# Patient Record
Sex: FEMALE | Race: WHITE | Employment: PART TIME | URBAN - METROPOLITAN AREA
[De-identification: names, ages, dates, MRNs, and addresses within clinical notes are randomized per-mention and may not be internally consistent; named-entity substitution may affect disease eponyms.]

---

## 2021-04-27 PROBLEM — E66.811 CLASS 1 OBESITY WITH BODY MASS INDEX (BMI) OF 34.0 TO 34.9 IN ADULT: Status: ACTIVE | Noted: 2021-04-27

## 2021-08-08 PROBLEM — E66.812 CLASS 2 OBESITY WITH BODY MASS INDEX (BMI) OF 35.0 TO 35.9 IN ADULT: Status: ACTIVE | Noted: 2021-08-08

## 2022-02-17 PROBLEM — E66.811 CLASS 1 OBESITY WITH BODY MASS INDEX (BMI) OF 34.0 TO 34.9 IN ADULT: Status: RESOLVED | Noted: 2021-04-27 | Resolved: 2022-02-17

## 2023-11-28 PROBLEM — E66.811 CLASS 1 OBESITY IN ADULT: Status: ACTIVE | Noted: 2023-11-28

## 2023-12-04 PROBLEM — E66.811 CLASS 1 OBESITY IN ADULT: Status: RESOLVED | Noted: 2023-11-28 | Resolved: 2023-12-04

## 2024-02-29 PROBLEM — E66.811 CLASS 1 OBESITY WITH BODY MASS INDEX (BMI) OF 34.0 TO 34.9 IN ADULT: Status: ACTIVE | Noted: 2024-02-29

## 2024-05-23 ENCOUNTER — OFFICE VISIT (OUTPATIENT)
Dept: FAMILY MEDICINE CLINIC | Age: 29
End: 2024-05-23

## 2024-05-23 ENCOUNTER — OFFICE VISIT (OUTPATIENT)
Dept: ENDOCRINOLOGY | Age: 29
End: 2024-05-23

## 2024-05-23 VITALS
RESPIRATION RATE: 18 BRPM | DIASTOLIC BLOOD PRESSURE: 80 MMHG | WEIGHT: 259.8 LBS | BODY MASS INDEX: 35.23 KG/M2 | OXYGEN SATURATION: 99 % | TEMPERATURE: 98.6 F | SYSTOLIC BLOOD PRESSURE: 108 MMHG | HEART RATE: 96 BPM

## 2024-05-23 VITALS
HEIGHT: 72 IN | DIASTOLIC BLOOD PRESSURE: 81 MMHG | TEMPERATURE: 98 F | SYSTOLIC BLOOD PRESSURE: 127 MMHG | BODY MASS INDEX: 34.95 KG/M2 | HEART RATE: 86 BPM | WEIGHT: 258 LBS | OXYGEN SATURATION: 99 % | RESPIRATION RATE: 14 BRPM

## 2024-05-23 DIAGNOSIS — R73.01 ELEVATED FASTING GLUCOSE: ICD-10-CM

## 2024-05-23 DIAGNOSIS — E55.9 VITAMIN D DEFICIENCY: ICD-10-CM

## 2024-05-23 DIAGNOSIS — N91.5 OLIGOMENORRHEA, UNSPECIFIED TYPE: ICD-10-CM

## 2024-05-23 DIAGNOSIS — E03.9 ACQUIRED HYPOTHYROIDISM: Primary | ICD-10-CM

## 2024-05-23 DIAGNOSIS — R79.89 ELEVATED LIVER FUNCTION TESTS: ICD-10-CM

## 2024-05-23 DIAGNOSIS — J06.9 PROTRACTED URI: Primary | ICD-10-CM

## 2024-05-23 DIAGNOSIS — E66.9 CLASS 1 OBESITY WITH BODY MASS INDEX (BMI) OF 34.0 TO 34.9 IN ADULT, UNSPECIFIED OBESITY TYPE, UNSPECIFIED WHETHER SERIOUS COMORBIDITY PRESENT: ICD-10-CM

## 2024-05-23 RX ORDER — AZITHROMYCIN 250 MG/1
TABLET, FILM COATED ORAL
Qty: 6 TABLET | Refills: 0 | Status: SHIPPED | OUTPATIENT
Start: 2024-05-23 | End: 2024-06-02

## 2024-05-23 ASSESSMENT — PATIENT HEALTH QUESTIONNAIRE - PHQ9
SUM OF ALL RESPONSES TO PHQ QUESTIONS 1-9: 0
3. TROUBLE FALLING OR STAYING ASLEEP: NOT AT ALL
SUM OF ALL RESPONSES TO PHQ QUESTIONS 1-9: 0
7. TROUBLE CONCENTRATING ON THINGS, SUCH AS READING THE NEWSPAPER OR WATCHING TELEVISION: NOT AT ALL
2. FEELING DOWN, DEPRESSED OR HOPELESS: NOT AT ALL
9. THOUGHTS THAT YOU WOULD BE BETTER OFF DEAD, OR OF HURTING YOURSELF: NOT AT ALL
4. FEELING TIRED OR HAVING LITTLE ENERGY: NOT AT ALL
8. MOVING OR SPEAKING SO SLOWLY THAT OTHER PEOPLE COULD HAVE NOTICED. OR THE OPPOSITE, BEING SO FIGETY OR RESTLESS THAT YOU HAVE BEEN MOVING AROUND A LOT MORE THAN USUAL: NOT AT ALL
SUM OF ALL RESPONSES TO PHQ9 QUESTIONS 1 & 2: 0
SUM OF ALL RESPONSES TO PHQ QUESTIONS 1-9: 0
10. IF YOU CHECKED OFF ANY PROBLEMS, HOW DIFFICULT HAVE THESE PROBLEMS MADE IT FOR YOU TO DO YOUR WORK, TAKE CARE OF THINGS AT HOME, OR GET ALONG WITH OTHER PEOPLE: NOT DIFFICULT AT ALL
SUM OF ALL RESPONSES TO PHQ QUESTIONS 1-9: 0
6. FEELING BAD ABOUT YOURSELF - OR THAT YOU ARE A FAILURE OR HAVE LET YOURSELF OR YOUR FAMILY DOWN: NOT AT ALL
5. POOR APPETITE OR OVEREATING: NOT AT ALL
1. LITTLE INTEREST OR PLEASURE IN DOING THINGS: NOT AT ALL

## 2024-05-23 ASSESSMENT — ENCOUNTER SYMPTOMS
SINUS COMPLAINT: 1
SORE THROAT: 1
SINUS PRESSURE: 1
RHINORRHEA: 1
DIARRHEA: 1
COUGH: 1

## 2024-05-23 NOTE — PROGRESS NOTES
SUBJECTIVE:  Torrie Acevedo is a 28 y.o. female who is being evaluated for elevated TSH.     1.  Acquired hypothyroidism  This started in 2021. Patient was diagnosed with elevated TSH. The problem has been unchanged. Previous thyroid studies include: TSH and free thyroxine.   Patient started medication in 2022. Currently patient is on: Levothyroxine. Misses Levothyroxine doses a month.  Was on thyroid medication in the past.     Current complaints: fatigue, not consistent     History of obstructive symptoms: difficulty swallowing No, changes in voice/hoarseness No.  History of radiation to patient's neck: No  Resent iodine exposure: No  Family history includes no thyroid abnormalities.  Family history of thyroid cancer: No     2. Vitamin D deficiency  No fatigue    3.  Elevated liver function tests  No nausea, vomiting, abdominal pain    4. Elevated fasting glucose  Has history of gestational diabetes    5.  Obesity  Diet and exercise    6. Oligomenorrhea, unspecified type  Trying to get pregnant  Periods every 2 months in the past  After delivery regular, now every 50-60  Has 1 child 16 yo boy  Started perods age 15  On BCP in the past  Has some hair growth on the face      Thyroid ultrasound       HISTORY: Thyroid enlargement       Right thyroid lobe 1.5 x 1.2 x 4.5 cm       Left lobe 1.2 x 1.6 x 3.6        Thyroid isthmus 3 mm       Parenchymal echotexture of the entire gland is uniform. No discrete nodule or extrathyroidal mass. Visualized carotid and jugular vessels patent.           Impression       Normal thyroid ultrasound         EXAMINATION:   CT OF THE PELVIS WITHOUT CONTRAST 2/5/2020 4:55 pm       TECHNIQUE:   CT of the pelvis was performed without the administration of intravenous   contrast.  Multiplanar reformatted images are provided for review. Dose   modulation, iterative reconstruction, and/or weight based adjustment of the   mA/kV was utilized to reduce the radiation dose to as low as

## 2024-05-23 NOTE — PROGRESS NOTES
(Exact Date)   SpO2 99%   BMI 35.23 kg/m²       Assessment/Plan    1. Protracted URI  - azithromycin (ZITHROMAX) 250 MG tablet; 500mg on day 1 followed by 250mg on days 2 - 5  Dispense: 6 tablet; Refill: 0  Symptomatic treatment: Tylenol / Advil, rest, salt water gargles, increase fluids.  May also use:Delsym.    Return if symptoms worsen or fail to improve.  See pt instructions  Discussed use, benefit, and side effects of prescribed medications. All patient questions answered.  Pt voiced understanding.

## 2024-05-24 DIAGNOSIS — Z31.69 ENCOUNTER FOR PRECONCEPTION CONSULTATION: ICD-10-CM

## 2024-05-24 LAB — PROGEST SERPL-MCNC: 21.47 NG/ML

## 2024-06-03 DIAGNOSIS — Z31.69 ENCOUNTER FOR PRECONCEPTION CONSULTATION: ICD-10-CM

## 2024-06-04 DIAGNOSIS — Z31.69 ENCOUNTER FOR PRECONCEPTION CONSULTATION: ICD-10-CM

## 2024-06-04 RX ORDER — VITAMIN A ACETATE, BETA CAROTENE, ASCORBIC ACID, CHOLECALCIFEROL, .ALPHA.-TOCOPHEROL ACETATE, DL-, THIAMINE MONONITRATE, RIBOFLAVIN, NIACINAMIDE, PYRIDOXINE HYDROCHLORIDE, FOLIC ACID, CYANOCOBALAMIN, CALCIUM CARBONATE, FERROUS FUMARATE, ZINC OXIDE, CUPRIC OXIDE 3080; 12; 120; 400; 1; 1.84; 3; 20; 22; 920; 25; 200; 27; 10; 2 [IU]/1; UG/1; MG/1; [IU]/1; MG/1; MG/1; MG/1; MG/1; MG/1; [IU]/1; MG/1; MG/1; MG/1; MG/1; MG/1
1 TABLET, FILM COATED ORAL DAILY
Qty: 90 TABLET | Refills: 3 | Status: SHIPPED | OUTPATIENT
Start: 2024-06-04

## 2024-06-04 RX ORDER — VITAMIN A ACETATE, BETA CAROTENE, ASCORBIC ACID, CHOLECALCIFEROL, .ALPHA.-TOCOPHEROL ACETATE, DL-, THIAMINE MONONITRATE, RIBOFLAVIN, NIACINAMIDE, PYRIDOXINE HYDROCHLORIDE, FOLIC ACID, CYANOCOBALAMIN, CALCIUM CARBONATE, FERROUS FUMARATE, ZINC OXIDE, CUPRIC OXIDE 3080; 12; 120; 400; 1; 1.84; 3; 20; 22; 920; 25; 200; 27; 10; 2 [IU]/1; UG/1; MG/1; [IU]/1; MG/1; MG/1; MG/1; MG/1; MG/1; [IU]/1; MG/1; MG/1; MG/1; MG/1; MG/1
1 TABLET, FILM COATED ORAL DAILY
Qty: 90 TABLET | Refills: 3 | Status: SHIPPED | OUTPATIENT
Start: 2024-06-04 | End: 2024-06-04 | Stop reason: SDUPTHER

## 2024-06-07 DIAGNOSIS — E55.9 VITAMIN D DEFICIENCY: ICD-10-CM

## 2024-06-07 DIAGNOSIS — E03.9 ACQUIRED HYPOTHYROIDISM: ICD-10-CM

## 2024-06-09 DIAGNOSIS — R10.13 DYSPEPSIA: ICD-10-CM

## 2024-06-10 RX ORDER — LEVOTHYROXINE SODIUM 0.05 MG/1
TABLET ORAL
Qty: 150 TABLET | Refills: 1 | Status: SHIPPED | OUTPATIENT
Start: 2024-06-10

## 2024-06-11 RX ORDER — OMEPRAZOLE 40 MG/1
40 CAPSULE, DELAYED RELEASE ORAL
Qty: 90 CAPSULE | Refills: 3 | Status: SHIPPED | OUTPATIENT
Start: 2024-06-11

## 2024-06-19 RX ORDER — BUPROPION HYDROCHLORIDE 300 MG/1
300 TABLET ORAL EVERY MORNING
Qty: 90 TABLET | Refills: 3 | Status: SHIPPED | OUTPATIENT
Start: 2024-06-19 | End: 2025-06-19

## 2024-06-19 NOTE — TELEPHONE ENCOUNTER
Medication:   Requested Prescriptions     Pending Prescriptions Disp Refills    buPROPion (WELLBUTRIN XL) 300 MG extended release tablet 90 tablet 3     Sig: Take 1 tablet by mouth every morning        Last Filled:      Patient Phone Number: [phone] (home)     Last appt: 5/23/2024   Next appt: Visit date not found    Last OARRS:        No data to display

## 2024-06-26 RX ORDER — ACETAMINOPHEN 325 MG/1
650 TABLET ORAL EVERY 6 HOURS PRN
Qty: 120 TABLET | Refills: 3 | Status: SHIPPED | OUTPATIENT
Start: 2024-06-26

## 2024-06-26 NOTE — TELEPHONE ENCOUNTER
Medication:   Requested Prescriptions     Pending Prescriptions Disp Refills    acetaminophen (AMINOFEN) 325 MG tablet 120 tablet 0     Sig: Take 2 tablets by mouth every 6 hours as needed for Pain        Last Filled:      Patient Phone Number: [phone] (home)     Last appt: 5/23/2024   Next appt: Visit date not found    Last OARRS:        No data to display

## 2024-08-23 DIAGNOSIS — E03.9 ACQUIRED HYPOTHYROIDISM: ICD-10-CM

## 2024-08-23 DIAGNOSIS — R79.89 ELEVATED LIVER FUNCTION TESTS: ICD-10-CM

## 2024-08-23 DIAGNOSIS — E55.9 VITAMIN D DEFICIENCY: ICD-10-CM

## 2024-08-24 LAB
25(OH)D3 SERPL-MCNC: 25.9 NG/ML
ALBUMIN SERPL-MCNC: 4.5 G/DL (ref 3.4–5)
ALBUMIN/GLOB SERPL: 1.6 {RATIO} (ref 1.1–2.2)
ALP SERPL-CCNC: 73 U/L (ref 40–129)
ALT SERPL-CCNC: 33 U/L (ref 10–40)
ANION GAP SERPL CALCULATED.3IONS-SCNC: 13 MMOL/L (ref 3–16)
AST SERPL-CCNC: 25 U/L (ref 15–37)
BILIRUB SERPL-MCNC: 0.4 MG/DL (ref 0–1)
BUN SERPL-MCNC: 16 MG/DL (ref 7–20)
CALCIUM SERPL-MCNC: 9.3 MG/DL (ref 8.3–10.6)
CHLORIDE SERPL-SCNC: 103 MMOL/L (ref 99–110)
CO2 SERPL-SCNC: 25 MMOL/L (ref 21–32)
CREAT SERPL-MCNC: 0.9 MG/DL (ref 0.6–1.1)
GFR SERPLBLD CREATININE-BSD FMLA CKD-EPI: 88 ML/MIN/{1.73_M2}
GLUCOSE SERPL-MCNC: 92 MG/DL (ref 70–99)
POTASSIUM SERPL-SCNC: 4.3 MMOL/L (ref 3.5–5.1)
PROT SERPL-MCNC: 7.3 G/DL (ref 6.4–8.2)
SODIUM SERPL-SCNC: 141 MMOL/L (ref 136–145)
T3FREE SERPL-MCNC: 2.7 PG/ML (ref 2.3–4.2)
T4 FREE SERPL-MCNC: 1.5 NG/DL (ref 0.9–1.8)
TSH SERPL DL<=0.005 MIU/L-ACNC: 1.6 UIU/ML (ref 0.27–4.2)

## 2024-08-29 ENCOUNTER — OFFICE VISIT (OUTPATIENT)
Dept: ENDOCRINOLOGY | Age: 29
End: 2024-08-29

## 2024-08-29 VITALS
BODY MASS INDEX: 35.35 KG/M2 | HEART RATE: 96 BPM | OXYGEN SATURATION: 99 % | HEIGHT: 72 IN | DIASTOLIC BLOOD PRESSURE: 91 MMHG | WEIGHT: 261 LBS | TEMPERATURE: 98 F | RESPIRATION RATE: 14 BRPM | SYSTOLIC BLOOD PRESSURE: 150 MMHG

## 2024-08-29 DIAGNOSIS — E55.9 VITAMIN D DEFICIENCY: ICD-10-CM

## 2024-08-29 DIAGNOSIS — R73.01 ELEVATED FASTING GLUCOSE: ICD-10-CM

## 2024-08-29 DIAGNOSIS — E66.01 CLASS 2 SEVERE OBESITY WITH SERIOUS COMORBIDITY AND BODY MASS INDEX (BMI) OF 35.0 TO 35.9 IN ADULT, UNSPECIFIED OBESITY TYPE (HCC): ICD-10-CM

## 2024-08-29 DIAGNOSIS — E03.9 ACQUIRED HYPOTHYROIDISM: Primary | ICD-10-CM

## 2024-08-29 DIAGNOSIS — R79.89 ELEVATED LIVER FUNCTION TESTS: ICD-10-CM

## 2024-08-29 DIAGNOSIS — N91.5 OLIGOMENORRHEA, UNSPECIFIED TYPE: ICD-10-CM

## 2024-08-29 NOTE — PROGRESS NOTES
SUBJECTIVE:  Torrie Acevedo is a 28 y.o. female who is being evaluated for elevated TSH.     1.  Acquired hypothyroidism  This started in 2021. Patient was diagnosed with elevated TSH. The problem has been unchanged. Previous thyroid studies include: TSH and free thyroxine.   Patient started medication in 2022. Currently patient is on: Levothyroxine. Misses Levothyroxine doses a month.  Was on thyroid medication in the past.     Current complaints: fatigue, not consistent     History of obstructive symptoms: difficulty swallowing No, changes in voice/hoarseness No.  History of radiation to patient's neck: No  Resent iodine exposure: No  Family history includes no thyroid abnormalities.  Family history of thyroid cancer: No     2. Vitamin D deficiency  No fatigue    3.  Elevated liver function tests  No nausea, vomiting, abdominal pain    4. Elevated fasting glucose  Has history of gestational diabetes    5.  Obesity  Diet and exercise    6. Oligomenorrhea, unspecified type  Trying to get pregnant  Periods every 2 months in the past  After delivery regular, now every 50-60  Has 1 child 18 yo boy  Started perods age 15  On BCP in the past  Has some hair growth on the face      Thyroid ultrasound       HISTORY: Thyroid enlargement       Right thyroid lobe 1.5 x 1.2 x 4.5 cm       Left lobe 1.2 x 1.6 x 3.6        Thyroid isthmus 3 mm       Parenchymal echotexture of the entire gland is uniform. No discrete nodule or extrathyroidal mass. Visualized carotid and jugular vessels patent.           Impression       Normal thyroid ultrasound         EXAMINATION:   CT OF THE PELVIS WITHOUT CONTRAST 2/5/2020 4:55 pm       TECHNIQUE:   CT of the pelvis was performed without the administration of intravenous   contrast.  Multiplanar reformatted images are provided for review. Dose   modulation, iterative reconstruction, and/or weight based adjustment of the   mA/kV was utilized to reduce the radiation dose to as low as  1.6 08/23/2024 09:46 AM    GLOB 3.1 09/10/2021 08:47 AM     Lab Results   Component Value Date/Time    TSHFT4 2.47 01/19/2021 09:53 AM    TSH 1.60 08/23/2024 09:46 AM    TSH 1.69 10/03/2018 11:54 AM    FT3 2.7 08/23/2024 09:46 AM     Lab Results   Component Value Date/Time    LABA1C 5.0 08/17/2023 02:08 PM     Lab Results   Component Value Date/Time    EAG 96.8 08/17/2023 02:08 PM     Lab Results   Component Value Date/Time    CHOL 147 10/03/2018 11:54 AM     Lab Results   Component Value Date/Time    TRIG 121 10/03/2018 11:54 AM     Lab Results   Component Value Date/Time    HDL 43 08/20/2020 10:44 AM     No components found for: \"LDLCHOLESTEROL\", \"LDLCALC\"    Lab Results   Component Value Date/Time    VLDL 24 08/20/2020 10:44 AM       No results found for: \"CHOLHDLRATIO\"  No results found for: \"CVHK90AVO\"    Lab Results   Component Value Date/Time    VITD25 25.9 08/23/2024 09:46 AM        ASSESSMENT/PLAN:  1. Acquired hypothyroidism  Prolactin 3.7  Had GB surgery  Needs lower TSH per OB-GYN   Levothyroxine 0.05 mg 1 tablet alternating with 1.5 tablet every other day  TSH 3.37-2.66-3.86-2.17-1.48-1.09-1.3-1.88-1.24-1.58-1.75-1.33-2.84-1.2-2.71-1.25-1.12- 1.6  Will try to get pregnant  TPO ab, Tg ab negative  - T3, Free; Future  - T4, Free; Future  - TSH without Reflex; Future     2. Vitamin D deficiency  Worsening  25OH vitamin D 28-34.6-30.8-26.4-29.6- 35.3-48.9-51.2-28.3 -30.4-27.4-30-28.7-30.4-25.9  On prenatal MVI  Start Vitamin D 1000 IU daily  - Comprehensive Metabolic Panel; Future  - Vitamin D 25 Hydroxy; Future    3.  Elevated liver function tests  Just had GB removed in 2/2023  ALT 67  AST 38  -CMP    4. Elevated fasting glucose  Has history of gestational diabetes  Glucose [phone]  Diet, exercise.  Metformin causes diarrhea, but taking  mg bid    5.  Obesity  Diet and exercise    6. Oligomenorrhea, unspecified type  10/10/2022 healthy baby boy.  Trying to get pregnant    Prolactin

## 2024-09-25 DIAGNOSIS — Z31.69 ENCOUNTER FOR PRECONCEPTION CONSULTATION: ICD-10-CM

## 2024-09-26 RX ORDER — METFORMIN HCL 500 MG
1000 TABLET, EXTENDED RELEASE 24 HR ORAL
Qty: 180 TABLET | Refills: 3 | Status: SHIPPED | OUTPATIENT
Start: 2024-09-26

## 2024-09-26 RX ORDER — BUPROPION HYDROCHLORIDE 300 MG/1
300 TABLET ORAL EVERY MORNING
Qty: 90 TABLET | Refills: 1 | Status: SHIPPED | OUTPATIENT
Start: 2024-09-26 | End: 2025-03-25

## 2024-10-02 ENCOUNTER — OFFICE VISIT (OUTPATIENT)
Dept: FAMILY MEDICINE CLINIC | Age: 29
End: 2024-10-02

## 2024-10-02 VITALS
HEART RATE: 92 BPM | WEIGHT: 256.8 LBS | SYSTOLIC BLOOD PRESSURE: 118 MMHG | OXYGEN SATURATION: 98 % | DIASTOLIC BLOOD PRESSURE: 82 MMHG | BODY MASS INDEX: 34.82 KG/M2

## 2024-10-02 DIAGNOSIS — F41.9 ANXIETY DISORDER, UNSPECIFIED TYPE: Primary | ICD-10-CM

## 2024-10-02 DIAGNOSIS — R19.5 CHANGE IN STOOL: ICD-10-CM

## 2024-10-02 DIAGNOSIS — Z23 NEED FOR VACCINATION: ICD-10-CM

## 2024-10-02 ASSESSMENT — ENCOUNTER SYMPTOMS: SHORTNESS OF BREATH: 0

## 2024-10-02 ASSESSMENT — PATIENT HEALTH QUESTIONNAIRE - PHQ9
10. IF YOU CHECKED OFF ANY PROBLEMS, HOW DIFFICULT HAVE THESE PROBLEMS MADE IT FOR YOU TO DO YOUR WORK, TAKE CARE OF THINGS AT HOME, OR GET ALONG WITH OTHER PEOPLE: NOT DIFFICULT AT ALL
SUM OF ALL RESPONSES TO PHQ QUESTIONS 1-9: 0
3. TROUBLE FALLING OR STAYING ASLEEP: NOT AT ALL
SUM OF ALL RESPONSES TO PHQ QUESTIONS 1-9: 0
9. THOUGHTS THAT YOU WOULD BE BETTER OFF DEAD, OR OF HURTING YOURSELF: NOT AT ALL
4. FEELING TIRED OR HAVING LITTLE ENERGY: NOT AT ALL
8. MOVING OR SPEAKING SO SLOWLY THAT OTHER PEOPLE COULD HAVE NOTICED. OR THE OPPOSITE, BEING SO FIGETY OR RESTLESS THAT YOU HAVE BEEN MOVING AROUND A LOT MORE THAN USUAL: NOT AT ALL
1. LITTLE INTEREST OR PLEASURE IN DOING THINGS: NOT AT ALL
SUM OF ALL RESPONSES TO PHQ QUESTIONS 1-9: 0
SUM OF ALL RESPONSES TO PHQ9 QUESTIONS 1 & 2: 0
6. FEELING BAD ABOUT YOURSELF - OR THAT YOU ARE A FAILURE OR HAVE LET YOURSELF OR YOUR FAMILY DOWN: NOT AT ALL
7. TROUBLE CONCENTRATING ON THINGS, SUCH AS READING THE NEWSPAPER OR WATCHING TELEVISION: NOT AT ALL
SUM OF ALL RESPONSES TO PHQ QUESTIONS 1-9: 0
5. POOR APPETITE OR OVEREATING: NOT AT ALL
2. FEELING DOWN, DEPRESSED OR HOPELESS: NOT AT ALL

## 2024-10-02 NOTE — PROGRESS NOTES
SUBJECTIVE:  Pt is a of 28 y.o. female comes in today with   Chief Complaint   Patient presents with    Depression     Pt wants to get off anti depressants     Stool Color Change     Pt states she passes corn for weeks after she eats them      Presenting today for re-evaluation of depression and anxiety. Does not feel she needs medication anymore. Denies feeling nervous, anxious or depressed. Does not feel the medication ever \"did anything\". She is seeing fertility specialist and wants to stop medication.     Concerned about stool. States when she eats corn she will pass kernels for weeks or months afterwards. No pain. No blood or mucus in stool. Chronic diarrhea since gb removed 3/23. No unexplained weight loss.     Prior to Visit Medications    Medication Sig Taking? Authorizing Provider   metFORMIN (GLUCOPHAGE-XR) 500 MG extended release tablet Take 2 tablets by mouth Daily with supper Yes Madeline Pérez MD   buPROPion (WELLBUTRIN XL) 300 MG extended release tablet Take 1 tablet by mouth every morning Yes Abida Doyle APRN - CNP   vitamin D (CHOLECALCIFEROL) 25 MCG (1000 UT) TABS tablet Take 1 tablet by mouth daily Yes Mary Bailey MD   acetaminophen (AMINOFEN) 325 MG tablet Take 2 tablets by mouth every 6 hours as needed for Pain Yes Abida Doyle APRN - CNP   omeprazole (PRILOSEC) 40 MG delayed release capsule Take 1 capsule by mouth every morning (before breakfast) Yes Abida Doyle APRN - CNP   levothyroxine (SYNTHROID) 50 MCG tablet 1 tablet alternating with 1.5 tablet every other day Yes Mary Bailey MD   Prenatal Vit-Fe Fumarate-FA (PRENATAL PLUS) 27-1 MG TABS Take 1 tablet by mouth daily Yes Madeline Pérez MD   ibuprofen (ADVIL;MOTRIN) 600 MG tablet TAKE 1 TABLET BY MOUTH EVERY 6 HOURS AS NEEDED FOR PAIN (GENERIC FOR MOTRIN) Yes Abida Doyle APRN - CNP   EPINEPHrine (EPIPEN) 0.3 MG/0.3ML SOAJ injection INJECT SYRINGE AS DIRECTED INTO THE MUSCLE FOR ALLERGIC REACTION Yes Michael Weiner

## 2024-10-28 ENCOUNTER — TELEMEDICINE (OUTPATIENT)
Dept: FAMILY MEDICINE CLINIC | Age: 29
End: 2024-10-28

## 2024-10-28 DIAGNOSIS — J06.9 ACUTE UPPER RESPIRATORY INFECTION, UNSPECIFIED: Primary | ICD-10-CM

## 2024-10-28 LAB
INFLUENZA A ANTIBODY: NORMAL
INFLUENZA B ANTIBODY: NORMAL
Lab: NORMAL
QC PASS/FAIL: NORMAL
SARS-COV-2 RDRP RESP QL NAA+PROBE: NEGATIVE

## 2024-10-28 RX ORDER — IBUPROFEN 600 MG/1
600 TABLET, FILM COATED ORAL EVERY 6 HOURS PRN
Qty: 90 TABLET | Refills: 0 | Status: SHIPPED | OUTPATIENT
Start: 2024-10-28

## 2024-10-28 ASSESSMENT — ENCOUNTER SYMPTOMS
SHORTNESS OF BREATH: 0
COUGH: 1
SINUS PRESSURE: 1
CHOKING: 0
SORE THROAT: 1
WHEEZING: 0
CHEST TIGHTNESS: 0
DIARRHEA: 1
VOMITING: 0

## 2024-10-28 ASSESSMENT — PATIENT HEALTH QUESTIONNAIRE - PHQ9
SUM OF ALL RESPONSES TO PHQ QUESTIONS 1-9: 0
5. POOR APPETITE OR OVEREATING: NOT AT ALL
SUM OF ALL RESPONSES TO PHQ9 QUESTIONS 1 & 2: 0
10. IF YOU CHECKED OFF ANY PROBLEMS, HOW DIFFICULT HAVE THESE PROBLEMS MADE IT FOR YOU TO DO YOUR WORK, TAKE CARE OF THINGS AT HOME, OR GET ALONG WITH OTHER PEOPLE: NOT DIFFICULT AT ALL
6. FEELING BAD ABOUT YOURSELF - OR THAT YOU ARE A FAILURE OR HAVE LET YOURSELF OR YOUR FAMILY DOWN: NOT AT ALL
8. MOVING OR SPEAKING SO SLOWLY THAT OTHER PEOPLE COULD HAVE NOTICED. OR THE OPPOSITE, BEING SO FIGETY OR RESTLESS THAT YOU HAVE BEEN MOVING AROUND A LOT MORE THAN USUAL: NOT AT ALL
4. FEELING TIRED OR HAVING LITTLE ENERGY: NOT AT ALL
1. LITTLE INTEREST OR PLEASURE IN DOING THINGS: NOT AT ALL
9. THOUGHTS THAT YOU WOULD BE BETTER OFF DEAD, OR OF HURTING YOURSELF: NOT AT ALL
3. TROUBLE FALLING OR STAYING ASLEEP: NOT AT ALL
SUM OF ALL RESPONSES TO PHQ QUESTIONS 1-9: 0
SUM OF ALL RESPONSES TO PHQ QUESTIONS 1-9: 0
7. TROUBLE CONCENTRATING ON THINGS, SUCH AS READING THE NEWSPAPER OR WATCHING TELEVISION: NOT AT ALL
2. FEELING DOWN, DEPRESSED OR HOPELESS: NOT AT ALL
SUM OF ALL RESPONSES TO PHQ QUESTIONS 1-9: 0

## 2024-10-28 NOTE — PROGRESS NOTES
Date of Visit:  10/28/2024    CC: Torrie Acevedo (: 1995) is a 28 y.o. female, established patient, here for evaluation/re-evaluation of the following medical concerns:    ASSESSMENT/PLAN:  Acute upper respiratory infection, unspecified  -     ibuprofen (ADVIL;MOTRIN) 600 MG tablet; Take 1 tablet by mouth every 6 hours as needed for Pain, Disp-90 tablet, R-0Normal  -     POCT COVID-19 Rapid, NAAT  -     POCT Influenza A/B  -     POCT rapid strep A  Pt will come to office today for testing  Symptomatic treatment: Tylenol / Advil, rest, salt water gargles, increase fluids.  May also use: current treatments in place      Return if symptoms worsen or fail to improve.         10/28/2024     9:56 AM   Patient-Reported Vitals   Patient-Reported Weight 258   Patient-Reported Height 6’0        Wt Readings from Last 3 Encounters:   10/02/24 116.5 kg (256 lb 12.8 oz)   24 118.4 kg (261 lb)   24 117.8 kg (259 lb 12.8 oz)     BP Readings from Last 3 Encounters:   10/02/24 118/82   24 (!) 150/91   24 108/80     Estimated body mass index is 34.82 kg/m² as calculated from the following:    Height as of 24: 1.829 m (6' 0.01\").    Weight as of 10/2/24: 116.5 kg (256 lb 12.8 oz).    HPI  Home COVID test negative yesterday    Cold Symptoms   This is a new problem. The current episode started in the past 7 days (5 days ago). The problem has been gradually worsening. Associated symptoms include congestion, coughing, diarrhea (chronic, no worse than normal), headaches and a sore throat. Pertinent negatives include no ear pain, vomiting or wheezing. Treatments tried: Mucinex, Dayquil, Nyquil. The treatment provided mild relief.         Review of Systems   Constitutional:  Positive for chills and fatigue (body aches). Negative for fever.   HENT:  Positive for congestion, postnasal drip, sinus pressure and sore throat. Negative for ear pain.    Respiratory:  Positive for cough. Negative for choking,

## 2024-12-02 ENCOUNTER — TELEPHONE (OUTPATIENT)
Dept: ENDOCRINOLOGY | Age: 29
End: 2024-12-02

## 2024-12-02 DIAGNOSIS — E03.9 ACQUIRED HYPOTHYROIDISM: ICD-10-CM

## 2024-12-02 DIAGNOSIS — R79.89 ELEVATED LIVER FUNCTION TESTS: ICD-10-CM

## 2024-12-02 DIAGNOSIS — N91.5 OLIGOMENORRHEA, UNSPECIFIED TYPE: ICD-10-CM

## 2024-12-02 DIAGNOSIS — E55.9 VITAMIN D DEFICIENCY: ICD-10-CM

## 2024-12-02 DIAGNOSIS — R73.01 ELEVATED FASTING GLUCOSE: ICD-10-CM

## 2024-12-02 RX ORDER — LEVOTHYROXINE SODIUM 50 UG/1
TABLET ORAL
Qty: 150 TABLET | Refills: 1 | Status: SHIPPED | OUTPATIENT
Start: 2024-12-02 | End: 2024-12-05 | Stop reason: SDUPTHER

## 2024-12-02 NOTE — TELEPHONE ENCOUNTER
Pt called in and is needing her lab work sent to .  Can you please fax this over today, if possible she needs to get it done prior to her visit.       Lab Fax: [phone]

## 2024-12-03 LAB
25(OH)D3 SERPL-MCNC: 26.3 NG/ML
ALBUMIN SERPL-MCNC: 4.4 G/DL (ref 3.4–5)
ALBUMIN/GLOB SERPL: 1.7 {RATIO} (ref 1.1–2.2)
ALP SERPL-CCNC: 65 U/L (ref 40–129)
ALT SERPL-CCNC: 16 U/L (ref 10–40)
ANION GAP SERPL CALCULATED.3IONS-SCNC: 11 MMOL/L (ref 3–16)
AST SERPL-CCNC: 19 U/L (ref 15–37)
BILIRUB SERPL-MCNC: <0.2 MG/DL (ref 0–1)
BUN SERPL-MCNC: 16 MG/DL (ref 7–20)
CALCIUM SERPL-MCNC: 9.5 MG/DL (ref 8.3–10.6)
CHLORIDE SERPL-SCNC: 102 MMOL/L (ref 99–110)
CO2 SERPL-SCNC: 27 MMOL/L (ref 21–32)
CREAT SERPL-MCNC: 0.8 MG/DL (ref 0.6–1.1)
EST. AVERAGE GLUCOSE BLD GHB EST-MCNC: 93.9 MG/DL
GFR SERPLBLD CREATININE-BSD FMLA CKD-EPI: >90 ML/MIN/{1.73_M2}
GLUCOSE SERPL-MCNC: 86 MG/DL (ref 70–99)
HBA1C MFR BLD: 4.9 %
POTASSIUM SERPL-SCNC: 4.3 MMOL/L (ref 3.5–5.1)
PROT SERPL-MCNC: 7 G/DL (ref 6.4–8.2)
SODIUM SERPL-SCNC: 140 MMOL/L (ref 136–145)
T3FREE SERPL-MCNC: 3.1 PG/ML (ref 2.3–4.2)
T4 FREE SERPL-MCNC: 1.1 NG/DL (ref 0.9–1.8)
TSH SERPL DL<=0.005 MIU/L-ACNC: 1.48 UIU/ML (ref 0.27–4.2)

## 2024-12-05 ENCOUNTER — OFFICE VISIT (OUTPATIENT)
Dept: ENDOCRINOLOGY | Age: 29
End: 2024-12-05
Payer: COMMERCIAL

## 2024-12-05 VITALS
HEART RATE: 103 BPM | WEIGHT: 261 LBS | DIASTOLIC BLOOD PRESSURE: 83 MMHG | SYSTOLIC BLOOD PRESSURE: 139 MMHG | BODY MASS INDEX: 35.35 KG/M2 | HEIGHT: 72 IN | OXYGEN SATURATION: 97 % | RESPIRATION RATE: 14 BRPM | TEMPERATURE: 98 F

## 2024-12-05 DIAGNOSIS — R73.01 ELEVATED FASTING GLUCOSE: ICD-10-CM

## 2024-12-05 DIAGNOSIS — E66.01 CLASS 2 SEVERE OBESITY WITH SERIOUS COMORBIDITY AND BODY MASS INDEX (BMI) OF 35.0 TO 35.9 IN ADULT, UNSPECIFIED OBESITY TYPE: ICD-10-CM

## 2024-12-05 DIAGNOSIS — N91.5 OLIGOMENORRHEA, UNSPECIFIED TYPE: ICD-10-CM

## 2024-12-05 DIAGNOSIS — E66.812 CLASS 2 SEVERE OBESITY WITH SERIOUS COMORBIDITY AND BODY MASS INDEX (BMI) OF 35.0 TO 35.9 IN ADULT, UNSPECIFIED OBESITY TYPE: ICD-10-CM

## 2024-12-05 DIAGNOSIS — E55.9 VITAMIN D DEFICIENCY: ICD-10-CM

## 2024-12-05 DIAGNOSIS — E03.9 ACQUIRED HYPOTHYROIDISM: Primary | ICD-10-CM

## 2024-12-05 DIAGNOSIS — R79.89 ELEVATED LIVER FUNCTION TESTS: ICD-10-CM

## 2024-12-05 PROCEDURE — 99214 OFFICE O/P EST MOD 30 MIN: CPT | Performed by: INTERNAL MEDICINE

## 2024-12-05 RX ORDER — LEVOTHYROXINE SODIUM 50 UG/1
TABLET ORAL
Qty: 150 TABLET | Refills: 1 | Status: SHIPPED | OUTPATIENT
Start: 2024-12-05

## 2024-12-05 RX ORDER — MULTIVIT-MIN/IRON/FOLIC ACID/K 18-600-40
2000 CAPSULE ORAL DAILY
Qty: 90 CAPSULE | Refills: 1 | Status: SHIPPED | OUTPATIENT
Start: 2024-12-05

## 2024-12-05 NOTE — PROGRESS NOTES
SUBJECTIVE:  Torrie Acevedo is a 29 y.o. female who is being evaluated for elevated TSH.     1.  Acquired hypothyroidism  This started in 2021. Patient was diagnosed with elevated TSH. The problem has been unchanged. Previous thyroid studies include: TSH and free thyroxine.   Patient started medication in 2022. Currently patient is on: Levothyroxine. Misses Levothyroxine doses a month.  Was on thyroid medication in the past.     Current complaints: fatigue, not consistent     History of obstructive symptoms: difficulty swallowing No, changes in voice/hoarseness No.  History of radiation to patient's neck: No  Resent iodine exposure: No  Family history includes no thyroid abnormalities.  Family history of thyroid cancer: No     2. Vitamin D deficiency  No fatigue    3.  Elevated liver function tests  No nausea, vomiting, abdominal pain    4. Elevated fasting glucose  Has history of gestational diabetes    5.  Obesity  Diet and exercise    6. Oligomenorrhea, unspecified [id number]  Periods every 2 months in the past  After delivery regular, now every 50-60  Has 1 child 18 yo boy  Started perods age 15  On BCP in the past  Has some hair growth on the face      Thyroid ultrasound       HISTORY: Thyroid enlargement       Right thyroid lobe 1.5 x 1.2 x 4.5 cm       Left lobe 1.2 x 1.6 x 3.6        Thyroid isthmus 3 mm       Parenchymal echotexture of the entire gland is uniform. No discrete nodule or extrathyroidal mass. Visualized carotid and jugular vessels patent.           Impression       Normal thyroid ultrasound         EXAMINATION:   CT OF THE PELVIS WITHOUT CONTRAST 2/5/2020 4:55 pm       TECHNIQUE:   CT of the pelvis was performed without the administration of intravenous   contrast.  Multiplanar reformatted images are provided for review. Dose   modulation, iterative reconstruction, and/or weight based adjustment of the   mA/kV was utilized to reduce the radiation dose to as low as reasonably   achievable.

## 2025-02-18 LAB
ALBUMIN: 4.3 G/DL (ref 3.5–5.7)
ALP BLD-CCNC: 56 U/L (ref 36–125)
ALT SERPL-CCNC: 14 U/L (ref 7–52)
ANION GAP SERPL CALCULATED.3IONS-SCNC: 11 MMOL/L (ref 3–16)
AST SERPL-CCNC: 15 U/L (ref 13–39)
BILIRUB SERPL-MCNC: 0.3 MG/DL (ref 0–1.5)
BUN BLDV-MCNC: 14 MG/DL (ref 7–25)
CALCIUM SERPL-MCNC: 9.7 MG/DL (ref 8.6–10.3)
CHLORIDE BLD-SCNC: 105 MMOL/L (ref 98–110)
CO2: 25 MMOL/L (ref 21–33)
CREAT SERPL-MCNC: 0.84 MG/DL (ref 0.6–1.3)
ESTIMATED GLOMERULAR FILTRATION RATE CREATININE EQUATION: >90
GLUCOSE BLD-MCNC: 96 MG/DL (ref 70–100)
HBA1C MFR BLD: 5.2 % (ref 4–5.6)
OSMOLALITY CALCULATION: 292 MOSM/KG (ref 278–305)
POTASSIUM SERPL-SCNC: 4 MMOL/L (ref 3.5–5.3)
SODIUM BLD-SCNC: 141 MMOL/L (ref 133–146)
T4 FREE: 0.79 NG/DL (ref 0.61–1.76)
TOTAL PROTEIN: 7.3 G/DL (ref 6.4–8.9)
TSH ULTRASENSITIVE: 1.61 UIU/ML (ref 0.45–4.12)
VITAMIN D 25-HYDROXY: 38.1 NG/ML (ref 30–100)

## 2025-03-17 ENCOUNTER — OFFICE VISIT (OUTPATIENT)
Dept: FAMILY MEDICINE CLINIC | Age: 30
End: 2025-03-17
Payer: COMMERCIAL

## 2025-03-17 ENCOUNTER — PATIENT MESSAGE (OUTPATIENT)
Dept: FAMILY MEDICINE CLINIC | Age: 30
End: 2025-03-17

## 2025-03-17 VITALS
WEIGHT: 261.2 LBS | SYSTOLIC BLOOD PRESSURE: 104 MMHG | OXYGEN SATURATION: 98 % | DIASTOLIC BLOOD PRESSURE: 78 MMHG | HEIGHT: 72 IN | HEART RATE: 91 BPM | BODY MASS INDEX: 35.38 KG/M2

## 2025-03-17 DIAGNOSIS — E66.01 CLASS 2 SEVERE OBESITY WITH SERIOUS COMORBIDITY AND BODY MASS INDEX (BMI) OF 35.0 TO 35.9 IN ADULT, UNSPECIFIED OBESITY TYPE: ICD-10-CM

## 2025-03-17 DIAGNOSIS — M25.562 CHRONIC PAIN OF LEFT KNEE: Primary | ICD-10-CM

## 2025-03-17 DIAGNOSIS — M79.604 BILATERAL LEG PAIN: ICD-10-CM

## 2025-03-17 DIAGNOSIS — G43.709 CHRONIC MIGRAINE WITHOUT AURA WITHOUT STATUS MIGRAINOSUS, NOT INTRACTABLE: ICD-10-CM

## 2025-03-17 DIAGNOSIS — E66.812 CLASS 2 SEVERE OBESITY WITH SERIOUS COMORBIDITY AND BODY MASS INDEX (BMI) OF 35.0 TO 35.9 IN ADULT, UNSPECIFIED OBESITY TYPE: ICD-10-CM

## 2025-03-17 DIAGNOSIS — M79.605 BILATERAL LEG PAIN: ICD-10-CM

## 2025-03-17 DIAGNOSIS — G89.29 CHRONIC PAIN OF LEFT KNEE: Primary | ICD-10-CM

## 2025-03-17 DIAGNOSIS — Z00.00 WELL ADULT EXAM: Primary | ICD-10-CM

## 2025-03-17 PROBLEM — E66.811 CLASS 1 OBESITY WITH BODY MASS INDEX (BMI) OF 34.0 TO 34.9 IN ADULT: Status: RESOLVED | Noted: 2024-02-29 | Resolved: 2025-03-17

## 2025-03-17 PROCEDURE — 99395 PREV VISIT EST AGE 18-39: CPT | Performed by: NURSE PRACTITIONER

## 2025-03-17 PROCEDURE — 99214 OFFICE O/P EST MOD 30 MIN: CPT | Performed by: NURSE PRACTITIONER

## 2025-03-17 RX ORDER — RIZATRIPTAN BENZOATE 10 MG/1
10 TABLET ORAL
Qty: 9 TABLET | Refills: 2 | Status: SHIPPED | OUTPATIENT
Start: 2025-03-17 | End: 2025-03-17

## 2025-03-17 RX ORDER — LETROZOLE 2.5 MG/1
7.5 TABLET, FILM COATED ORAL DAILY
COMMUNITY
Start: 2025-01-22

## 2025-03-17 SDOH — ECONOMIC STABILITY: FOOD INSECURITY: WITHIN THE PAST 12 MONTHS, THE FOOD YOU BOUGHT JUST DIDN'T LAST AND YOU DIDN'T HAVE MONEY TO GET MORE.: NEVER TRUE

## 2025-03-17 SDOH — ECONOMIC STABILITY: FOOD INSECURITY: WITHIN THE PAST 12 MONTHS, YOU WORRIED THAT YOUR FOOD WOULD RUN OUT BEFORE YOU GOT MONEY TO BUY MORE.: NEVER TRUE

## 2025-03-17 ASSESSMENT — PATIENT HEALTH QUESTIONNAIRE - PHQ9
1. LITTLE INTEREST OR PLEASURE IN DOING THINGS: NOT AT ALL
SUM OF ALL RESPONSES TO PHQ QUESTIONS 1-9: 0
2. FEELING DOWN, DEPRESSED OR HOPELESS: NOT AT ALL
4. FEELING TIRED OR HAVING LITTLE ENERGY: NOT AT ALL
5. POOR APPETITE OR OVEREATING: NOT AT ALL
SUM OF ALL RESPONSES TO PHQ QUESTIONS 1-9: 0
9. THOUGHTS THAT YOU WOULD BE BETTER OFF DEAD, OR OF HURTING YOURSELF: NOT AT ALL
6. FEELING BAD ABOUT YOURSELF - OR THAT YOU ARE A FAILURE OR HAVE LET YOURSELF OR YOUR FAMILY DOWN: NOT AT ALL
8. MOVING OR SPEAKING SO SLOWLY THAT OTHER PEOPLE COULD HAVE NOTICED. OR THE OPPOSITE, BEING SO FIGETY OR RESTLESS THAT YOU HAVE BEEN MOVING AROUND A LOT MORE THAN USUAL: NOT AT ALL
10. IF YOU CHECKED OFF ANY PROBLEMS, HOW DIFFICULT HAVE THESE PROBLEMS MADE IT FOR YOU TO DO YOUR WORK, TAKE CARE OF THINGS AT HOME, OR GET ALONG WITH OTHER PEOPLE: NOT DIFFICULT AT ALL
SUM OF ALL RESPONSES TO PHQ QUESTIONS 1-9: 0
SUM OF ALL RESPONSES TO PHQ QUESTIONS 1-9: 0
3. TROUBLE FALLING OR STAYING ASLEEP: NOT AT ALL
7. TROUBLE CONCENTRATING ON THINGS, SUCH AS READING THE NEWSPAPER OR WATCHING TELEVISION: NOT AT ALL

## 2025-03-17 NOTE — ASSESSMENT & PLAN NOTE
Chronic, not at goal (unstable), Trial Zepbound. Awaiting insurance approval   Lifestyle recommendations:  Weight loss, aerobic exercise, and start diet lower in saturated fats.   - Increase fresh fruits/vegetables, (baked) fish, chicken, turkey.   - Limit red meat to 1-2 times per month.   - Limit fast food to 1-2 times a month.   - Decrease carbohydrates and refined sugar.   - Start aerobic exercise (brisk walking) for 30 minutes or greater, 4-5 times per week.

## 2025-03-17 NOTE — ASSESSMENT & PLAN NOTE
Chronic, worsening (exacerbation), Trial new start Maxalt  May consider preventative med like Topamax if symptoms persist  May still take Tyl or Excedrin migraine prn  RTO 1 month if no improvement

## 2025-04-01 ENCOUNTER — TELEPHONE (OUTPATIENT)
Dept: FAMILY MEDICINE CLINIC | Age: 30
End: 2025-04-01

## 2025-04-01 NOTE — TELEPHONE ENCOUNTER
Pt stopped into office and is need of Abida Doyle' signature.    Please call when ready for .   [phone]    There was no available fax number to fax to so just call pt when signed.      Forms scanned into media attached to this encounter.

## 2025-04-16 ENCOUNTER — OFFICE VISIT (OUTPATIENT)
Dept: FAMILY MEDICINE CLINIC | Age: 30
End: 2025-04-16
Payer: COMMERCIAL

## 2025-04-16 VITALS
WEIGHT: 263.2 LBS | BODY MASS INDEX: 35.7 KG/M2 | HEART RATE: 90 BPM | SYSTOLIC BLOOD PRESSURE: 126 MMHG | DIASTOLIC BLOOD PRESSURE: 70 MMHG | OXYGEN SATURATION: 98 %

## 2025-04-16 DIAGNOSIS — G43.709 CHRONIC MIGRAINE WITHOUT AURA WITHOUT STATUS MIGRAINOSUS, NOT INTRACTABLE: Primary | ICD-10-CM

## 2025-04-16 DIAGNOSIS — H66.002 NON-RECURRENT ACUTE SUPPURATIVE OTITIS MEDIA OF LEFT EAR WITHOUT SPONTANEOUS RUPTURE OF TYMPANIC MEMBRANE: ICD-10-CM

## 2025-04-16 PROCEDURE — G2211 COMPLEX E/M VISIT ADD ON: HCPCS | Performed by: NURSE PRACTITIONER

## 2025-04-16 PROCEDURE — 99214 OFFICE O/P EST MOD 30 MIN: CPT | Performed by: NURSE PRACTITIONER

## 2025-04-16 RX ORDER — TOPIRAMATE 25 MG/1
TABLET, FILM COATED ORAL
Qty: 360 TABLET | Refills: 0 | Status: SHIPPED | OUTPATIENT
Start: 2025-04-16

## 2025-04-16 RX ORDER — AMOXICILLIN 500 MG/1
500 CAPSULE ORAL 3 TIMES DAILY
Qty: 21 CAPSULE | Refills: 0 | Status: SHIPPED | OUTPATIENT
Start: 2025-04-16 | End: 2025-04-23

## 2025-04-16 ASSESSMENT — PATIENT HEALTH QUESTIONNAIRE - PHQ9
SUM OF ALL RESPONSES TO PHQ QUESTIONS 1-9: 0
9. THOUGHTS THAT YOU WOULD BE BETTER OFF DEAD, OR OF HURTING YOURSELF: NOT AT ALL
6. FEELING BAD ABOUT YOURSELF - OR THAT YOU ARE A FAILURE OR HAVE LET YOURSELF OR YOUR FAMILY DOWN: NOT AT ALL
7. TROUBLE CONCENTRATING ON THINGS, SUCH AS READING THE NEWSPAPER OR WATCHING TELEVISION: NOT AT ALL
1. LITTLE INTEREST OR PLEASURE IN DOING THINGS: NOT AT ALL
SUM OF ALL RESPONSES TO PHQ QUESTIONS 1-9: 0
4. FEELING TIRED OR HAVING LITTLE ENERGY: NOT AT ALL
8. MOVING OR SPEAKING SO SLOWLY THAT OTHER PEOPLE COULD HAVE NOTICED. OR THE OPPOSITE, BEING SO FIGETY OR RESTLESS THAT YOU HAVE BEEN MOVING AROUND A LOT MORE THAN USUAL: NOT AT ALL
3. TROUBLE FALLING OR STAYING ASLEEP: NOT AT ALL
10. IF YOU CHECKED OFF ANY PROBLEMS, HOW DIFFICULT HAVE THESE PROBLEMS MADE IT FOR YOU TO DO YOUR WORK, TAKE CARE OF THINGS AT HOME, OR GET ALONG WITH OTHER PEOPLE: NOT DIFFICULT AT ALL
SUM OF ALL RESPONSES TO PHQ QUESTIONS 1-9: 0
2. FEELING DOWN, DEPRESSED OR HOPELESS: NOT AT ALL
5. POOR APPETITE OR OVEREATING: NOT AT ALL
SUM OF ALL RESPONSES TO PHQ QUESTIONS 1-9: 0

## 2025-04-16 ASSESSMENT — ENCOUNTER SYMPTOMS
RHINORRHEA: 0
SHORTNESS OF BREATH: 0
NAUSEA: 0
VOMITING: 0
PHOTOPHOBIA: 0
SORE THROAT: 0

## 2025-04-16 NOTE — PROGRESS NOTES
SUBJECTIVE:  Pt is a of 29 y.o. female comes in today with   Chief Complaint   Patient presents with    Headache    Ear Problem     Ear itching      Presenting today for re-evaluation of headaches. Initial eval with me 3/17/25. Headaches have change in presentation- not pain is posterior and radiates up throughout head. Denies neck injury. Slight neck pain but full ROM. Once headache resolves so does neck pain.   Getting headaches QOD   First 2 doses of Maxalt worked within 30 min to alleviate pain. Last time she took it cause vomiting and has not taken since.   Denies nausea or vomiting. (+) blurry vision when pain is severe. Denies light or sound sensitivity.    Also c/o left ear pain and itching. Present for over 1 week. Using OTC ear drops but no improvement in symptoms. No fevers or drainage     Prior to Visit Medications    Medication Sig Taking? Authorizing Provider   topiramate (TOPAMAX) 25 MG tablet Take by mouth 25 mg nightly for 2 weeks, then take 25 mg BID for 2 weeks, then take 25 mg in morning and 50 mg in evening for 2 weeks, then increase to 50 mg BID. Yes Abida Doyle APRN - CNP   amoxicillin (AMOXIL) 500 MG capsule Take 1 capsule by mouth 3 times daily for 7 days Yes Abida Doyle APRN - CNP   letrozole (FEMARA) 2.5 MG tablet Take 3 tablets by mouth daily Yes Provider, MD James   tirzepatide-weight management (ZEPBOUND) 2.5 MG/0.5ML SOAJ subCUTAneous auto-injector pen Inject 2.5 mg into the skin every 7 days Yes Abida Doyle APRN - CNP   vitamin D (VITAMIN D, CHOLECALCIFEROL,) 50 MCG (2000 UT) CAPS capsule Take 1 capsule by mouth daily Yes Mary Bailey MD   levothyroxine (SYNTHROID) 50 MCG tablet Take 1 tablet alternating with 1.5 tablet by mouth every other day. Yes Mary Bailey MD   ibuprofen (ADVIL;MOTRIN) 600 MG tablet Take 1 tablet by mouth every 6 hours as needed for Pain Yes Abida Doyle APRN - CNP   metFORMIN (GLUCOPHAGE-XR) 500 MG extended release tablet Take 2

## 2025-05-02 DIAGNOSIS — B37.9 YEAST INFECTION: Primary | ICD-10-CM

## 2025-05-02 RX ORDER — FLUCONAZOLE 150 MG/1
150 TABLET ORAL ONCE
Qty: 1 TABLET | Refills: 0 | Status: SHIPPED | OUTPATIENT
Start: 2025-05-02 | End: 2025-05-02

## 2025-05-28 ENCOUNTER — OFFICE VISIT (OUTPATIENT)
Dept: FAMILY MEDICINE CLINIC | Age: 30
End: 2025-05-28
Payer: COMMERCIAL

## 2025-05-28 VITALS
WEIGHT: 258.8 LBS | DIASTOLIC BLOOD PRESSURE: 64 MMHG | OXYGEN SATURATION: 97 % | SYSTOLIC BLOOD PRESSURE: 100 MMHG | RESPIRATION RATE: 18 BRPM | HEART RATE: 88 BPM | BODY MASS INDEX: 35.1 KG/M2

## 2025-05-28 DIAGNOSIS — M79.605 LEG PAIN, BILATERAL: ICD-10-CM

## 2025-05-28 DIAGNOSIS — G43.709 CHRONIC MIGRAINE WITHOUT AURA WITHOUT STATUS MIGRAINOSUS, NOT INTRACTABLE: Primary | ICD-10-CM

## 2025-05-28 DIAGNOSIS — M79.604 LEG PAIN, BILATERAL: ICD-10-CM

## 2025-05-28 PROCEDURE — 99214 OFFICE O/P EST MOD 30 MIN: CPT | Performed by: NURSE PRACTITIONER

## 2025-05-28 RX ORDER — CYCLOBENZAPRINE HCL 10 MG
10 TABLET ORAL 3 TIMES DAILY PRN
Qty: 30 TABLET | Refills: 0 | Status: SHIPPED | OUTPATIENT
Start: 2025-05-28 | End: 2025-06-07

## 2025-05-28 ASSESSMENT — ENCOUNTER SYMPTOMS: NAUSEA: 1

## 2025-05-28 NOTE — PROGRESS NOTES
SUBJECTIVE:  Pt is a of 29 y.o. female comes in today with   Chief Complaint   Patient presents with    Medication Check     Parking placard is coming due as well       Presenting today for re-evaluation of headaches. Last visit with me 4/16/25 and started Topamax at that time, 25 mg nightly. States huge improvement in headaches. Has only needed Maxalt twice in past month. Tolerating well, has stopped drinking soda- can longer feel carbonation.     Chronic leg pain- exacerbates with increased activity. Going to [place] and knows that activity will increase pain. Tyl and ibu does not help. Also needing handicap placard renewed    Wt Readings from Last 3 Encounters:   05/28/25 117.4 kg (258 lb 12.8 oz)   04/16/25 119.4 kg (263 lb 3.2 oz)   03/17/25 118.5 kg (261 lb 3.2 oz)           Prior to Visit Medications    Medication Sig Taking? Authorizing Provider   topiramate (TOPAMAX) 25 MG tablet Take by mouth 25 mg nightly for 2 weeks, then take 25 mg BID for 2 weeks, then take 25 mg in morning and 50 mg in evening for 2 weeks, then increase to 50 mg BID. Yes Abida Doyle APRN - CNP   letrozole (FEMARA) 2.5 MG tablet Take 3 tablets by mouth daily Yes ProviderJames MD   rizatriptan (MAXALT) 10 MG tablet Take 1 tablet by mouth once as needed for Migraine May repeat in 2 hours if needed Yes Abida Doyle APRN - CNP   vitamin D (VITAMIN D, CHOLECALCIFEROL,) 50 MCG (2000 UT) CAPS capsule Take 1 capsule by mouth daily Yes Mary Bailey MD   levothyroxine (SYNTHROID) 50 MCG tablet Take 1 tablet alternating with 1.5 tablet by mouth every other day. Yes Mary Bailey MD   ibuprofen (ADVIL;MOTRIN) 600 MG tablet Take 1 tablet by mouth every 6 hours as needed for Pain Yes Abida Doyle APRN - CNP   metFORMIN (GLUCOPHAGE-XR) 500 MG extended release tablet Take 2 tablets by mouth Daily with supper Yes Madeline Pérez MD   buPROPion (WELLBUTRIN XL) 300 MG extended release tablet Take 1 tablet by mouth every

## 2025-05-28 NOTE — ASSESSMENT & PLAN NOTE
Chronic, not at goal (unstable), continue Ty and IBU prn  Trial new start Flexeril. No driving or ETOH when taking  Handicap placard renewed

## 2025-06-30 DIAGNOSIS — E55.9 VITAMIN D DEFICIENCY: ICD-10-CM

## 2025-06-30 DIAGNOSIS — R10.13 DYSPEPSIA: ICD-10-CM

## 2025-07-01 RX ORDER — ACETAMINOPHEN 160 MG
TABLET,DISINTEGRATING ORAL
Qty: 90 CAPSULE | Refills: 1 | OUTPATIENT
Start: 2025-07-01

## 2025-07-01 NOTE — TELEPHONE ENCOUNTER
Medication:   Requested Prescriptions     Pending Prescriptions Disp Refills    omeprazole (PRILOSEC) 40 MG delayed release capsule [Pharmacy Med Name: omeprazole 40 mg capsule,delayed release (PRILOSEC)] 90 capsule 3     Sig: Take 1 capsule (40 mg total) by mouth every morning before breakfast.        Last Filled:      Patient Phone Number: [phone] (home)     Last appt: 5/28/2025   Next appt: Visit date not found    Last OARRS:        No data to display

## 2025-07-02 RX ORDER — OMEPRAZOLE 40 MG/1
CAPSULE, DELAYED RELEASE ORAL
Qty: 90 CAPSULE | Refills: 3 | Status: SHIPPED | OUTPATIENT
Start: 2025-07-02

## 2025-07-02 NOTE — TELEPHONE ENCOUNTER
Patient called and stated she really needs medication filled as soon as possible.    Please advise  [phone]